# Patient Record
Sex: FEMALE | Race: ASIAN | NOT HISPANIC OR LATINO | URBAN - METROPOLITAN AREA
[De-identification: names, ages, dates, MRNs, and addresses within clinical notes are randomized per-mention and may not be internally consistent; named-entity substitution may affect disease eponyms.]

---

## 2023-04-17 ENCOUNTER — EMERGENCY (EMERGENCY)
Facility: HOSPITAL | Age: 28
LOS: 1 days | Discharge: HOME | End: 2023-04-17
Attending: PERSONAL EMERGENCY RESPONSE ATTENDANT | Admitting: EMERGENCY MEDICINE
Payer: MEDICAID

## 2023-04-17 VITALS
RESPIRATION RATE: 18 BRPM | HEART RATE: 90 BPM | OXYGEN SATURATION: 100 % | SYSTOLIC BLOOD PRESSURE: 128 MMHG | DIASTOLIC BLOOD PRESSURE: 77 MMHG | TEMPERATURE: 99 F

## 2023-04-17 LAB
ALBUMIN SERPL ELPH-MCNC: 3.8 G/DL — SIGNIFICANT CHANGE UP (ref 3.3–5)
ALP SERPL-CCNC: 58 U/L — SIGNIFICANT CHANGE UP (ref 40–120)
ALT FLD-CCNC: 23 U/L — SIGNIFICANT CHANGE UP (ref 4–33)
ANION GAP SERPL CALC-SCNC: 12 MMOL/L — SIGNIFICANT CHANGE UP (ref 7–14)
APPEARANCE UR: CLEAR — SIGNIFICANT CHANGE UP
AST SERPL-CCNC: 36 U/L — HIGH (ref 4–32)
BASOPHILS # BLD AUTO: 0.05 K/UL — SIGNIFICANT CHANGE UP (ref 0–0.2)
BASOPHILS NFR BLD AUTO: 0.4 % — SIGNIFICANT CHANGE UP (ref 0–2)
BILIRUB SERPL-MCNC: <0.2 MG/DL — SIGNIFICANT CHANGE UP (ref 0.2–1.2)
BILIRUB UR-MCNC: NEGATIVE — SIGNIFICANT CHANGE UP
BUN SERPL-MCNC: 8 MG/DL — SIGNIFICANT CHANGE UP (ref 7–23)
CALCIUM SERPL-MCNC: 9.2 MG/DL — SIGNIFICANT CHANGE UP (ref 8.4–10.5)
CHLORIDE SERPL-SCNC: 103 MMOL/L — SIGNIFICANT CHANGE UP (ref 98–107)
CO2 SERPL-SCNC: 18 MMOL/L — LOW (ref 22–31)
COLOR SPEC: COLORLESS — SIGNIFICANT CHANGE UP
CREAT SERPL-MCNC: 0.46 MG/DL — LOW (ref 0.5–1.3)
DIFF PNL FLD: NEGATIVE — SIGNIFICANT CHANGE UP
EGFR: 134 ML/MIN/1.73M2 — SIGNIFICANT CHANGE UP
EOSINOPHIL # BLD AUTO: 0.04 K/UL — SIGNIFICANT CHANGE UP (ref 0–0.5)
EOSINOPHIL NFR BLD AUTO: 0.3 % — SIGNIFICANT CHANGE UP (ref 0–6)
FLUAV AG NPH QL: SIGNIFICANT CHANGE UP
FLUBV AG NPH QL: SIGNIFICANT CHANGE UP
GLUCOSE SERPL-MCNC: 79 MG/DL — SIGNIFICANT CHANGE UP (ref 70–99)
GLUCOSE UR QL: NEGATIVE — SIGNIFICANT CHANGE UP
HCT VFR BLD CALC: 33.9 % — LOW (ref 34.5–45)
HGB BLD-MCNC: 11 G/DL — LOW (ref 11.5–15.5)
IANC: 10.21 K/UL — HIGH (ref 1.8–7.4)
IMM GRANULOCYTES NFR BLD AUTO: 1.1 % — HIGH (ref 0–0.9)
KETONES UR-MCNC: NEGATIVE — SIGNIFICANT CHANGE UP
LEUKOCYTE ESTERASE UR-ACNC: NEGATIVE — SIGNIFICANT CHANGE UP
LYMPHOCYTES # BLD AUTO: 16 % — SIGNIFICANT CHANGE UP (ref 13–44)
LYMPHOCYTES # BLD AUTO: 2.11 K/UL — SIGNIFICANT CHANGE UP (ref 1–3.3)
MCHC RBC-ENTMCNC: 31 PG — SIGNIFICANT CHANGE UP (ref 27–34)
MCHC RBC-ENTMCNC: 32.4 GM/DL — SIGNIFICANT CHANGE UP (ref 32–36)
MCV RBC AUTO: 95.5 FL — SIGNIFICANT CHANGE UP (ref 80–100)
MONOCYTES # BLD AUTO: 0.66 K/UL — SIGNIFICANT CHANGE UP (ref 0–0.9)
MONOCYTES NFR BLD AUTO: 5 % — SIGNIFICANT CHANGE UP (ref 2–14)
NEUTROPHILS # BLD AUTO: 10.21 K/UL — HIGH (ref 1.8–7.4)
NEUTROPHILS NFR BLD AUTO: 77.2 % — HIGH (ref 43–77)
NITRITE UR-MCNC: NEGATIVE — SIGNIFICANT CHANGE UP
NRBC # BLD: 0 /100 WBCS — SIGNIFICANT CHANGE UP (ref 0–0)
NRBC # FLD: 0 K/UL — SIGNIFICANT CHANGE UP (ref 0–0)
PH UR: 7 — SIGNIFICANT CHANGE UP (ref 5–8)
PLATELET # BLD AUTO: 239 K/UL — SIGNIFICANT CHANGE UP (ref 150–400)
POTASSIUM SERPL-MCNC: 4.5 MMOL/L — SIGNIFICANT CHANGE UP (ref 3.5–5.3)
POTASSIUM SERPL-SCNC: 4.5 MMOL/L — SIGNIFICANT CHANGE UP (ref 3.5–5.3)
PROT SERPL-MCNC: 7.3 G/DL — SIGNIFICANT CHANGE UP (ref 6–8.3)
PROT UR-MCNC: NEGATIVE — SIGNIFICANT CHANGE UP
RBC # BLD: 3.55 M/UL — LOW (ref 3.8–5.2)
RBC # FLD: 13 % — SIGNIFICANT CHANGE UP (ref 10.3–14.5)
RSV RNA NPH QL NAA+NON-PROBE: SIGNIFICANT CHANGE UP
SARS-COV-2 RNA SPEC QL NAA+PROBE: SIGNIFICANT CHANGE UP
SODIUM SERPL-SCNC: 133 MMOL/L — LOW (ref 135–145)
SP GR SPEC: 1 — LOW (ref 1.01–1.05)
UROBILINOGEN FLD QL: SIGNIFICANT CHANGE UP
WBC # BLD: 13.22 K/UL — HIGH (ref 3.8–10.5)
WBC # FLD AUTO: 13.22 K/UL — HIGH (ref 3.8–10.5)

## 2023-04-17 PROCEDURE — 93010 ELECTROCARDIOGRAM REPORT: CPT

## 2023-04-17 PROCEDURE — 93306 TTE W/DOPPLER COMPLETE: CPT | Mod: 26

## 2023-04-17 PROCEDURE — 99223 1ST HOSP IP/OBS HIGH 75: CPT

## 2023-04-17 RX ORDER — SODIUM CHLORIDE 9 MG/ML
1000 INJECTION INTRAMUSCULAR; INTRAVENOUS; SUBCUTANEOUS ONCE
Refills: 0 | Status: COMPLETED | OUTPATIENT
Start: 2023-04-17 | End: 2023-04-17

## 2023-04-17 RX ADMIN — SODIUM CHLORIDE 1000 MILLILITER(S): 9 INJECTION INTRAMUSCULAR; INTRAVENOUS; SUBCUTANEOUS at 20:03

## 2023-04-17 NOTE — ED ADULT NURSE REASSESSMENT NOTE - NS ED NURSE REASSESS COMMENT FT1
RAC IV not patent. IV removed. 22g IVL placed in left hand. Fluids administered as ordered, see MAR. Respirations even and unlabored, chest rise equal b/l. Pt stable upon leaving bedside. Safety maintained throughout.

## 2023-04-17 NOTE — ED ADULT TRIAGE NOTE - CHIEF COMPLAINT QUOTE
Pt reporting tot he ED for syncope ~ 1 hour ago. + LOC while siting but did not hit her head. Pt is 5 months pregnant, Due date , , 1 miscarriage. No abdominal pain, vaginal bleeding.

## 2023-04-17 NOTE — ED CDU PROVIDER INITIAL DAY NOTE - OBJECTIVE STATEMENT
27y Female, g2po,  at 20 weeks gestation w/pmhx asthma presenting with syncope. Pt states today while wiping down a countertop she developed shortness of breath and chest tightness, took off her bra which did not help, she then developed lightheadedness and blurry vision. Pt sat down as she felt like she was going to pass out, LOC for a "few minutes", was witnessed by , did not fall off chair, no head injury. Pt reports over the past few weeks (2-3) she has been having shortness of breath, feels she can't get enough air in, worse with laying flat. Pt denies fever/chills, cough, wheeze, headache, chest pain, palpitations, abd pain, pelvic pain, vaginal bleeding, vaginal discharge, recent travel or illness or any other concerns. Of note pt went to see her OBGYN after the incident and was sent to the ED. IN ER ekg wnl, abs wnl, TTE performed, however unabl eto get results yet, will be sent to cdu for tele, await TTE results, reasses

## 2023-04-17 NOTE — ED PROVIDER NOTE - NS ED ATTENDING STATEMENT MOD
This was a shared visit with the ZAHIRA. I reviewed and verified the documentation and independently performed the documented:

## 2023-04-17 NOTE — ED ADULT NURSE NOTE - OBJECTIVE STATEMENT
Received pt in bed A and Ox3 in NAD [facility] extremitates are strong and equal B/L  pt c/o having an episode of SOB , diaphoresis and syncope, denies fall or injury reports was eased tot he floor by a bystander,   no obvious injuries noted, pt denies any OB complaints.

## 2023-04-17 NOTE — ED PROVIDER NOTE - OBJECTIVE STATEMENT
27yF at 20 weeks gestation w/pmhx asthma presenting with syncope. Pt states today while wiping down a countertop she developed shortness of breath and chest tightness, took off her bra which did not help, she then developed lightheadedness and blurry vision. Pt sat down as she felt like she was going to pass out, LOC for a "few minutes", was witnessed by , did not fall off chair, no head injury. Pt reports over the past few weeks (2-3) she has been having shortness of breath, feels she can't get enough air in, worse with laying flat. Pt denies fever/chills, cough, wheeze, headache, chest pain, palpitations, abd pain, pelvic pain, vaginal bleeding, vaginal discharge, recent travel or illness or any other concerns. Of note pt went to see her OBGYN after the incident and was sent to the ED.

## 2023-04-17 NOTE — ED PROVIDER NOTE - PROGRESS NOTE DETAILS
URVASHI Kuhn: Spoke with pts MARIXA, she was seen in the office 3 days ago (not today) for shortness of breath, was told at that time to come to the ED for evaluation. Discussed plan to check basic labs, echo, if no evidence of right heart strain on echo unlikely PE, would d/c home with OB f/u in the office. Pts OB  [phone] in agreement with this plan. URVASHI Kuhn: Echo performed today but was not read, attempted to reach cardiology covering for echo reads without success. Will place in CDU on tele awaiting echo results.

## 2023-04-17 NOTE — ED PROVIDER NOTE - ATTENDING APP SHARED VISIT CONTRIBUTION OF CARE
Attending MD Valencia:  I performed a history and physical exam of the patient and discussed their management with the PA. I reviewed the PA's note and agree with the documented findings and plan of care. My medical decision making and observations are found above.

## 2023-04-17 NOTE — ED CDU PROVIDER INITIAL DAY NOTE - CLINICAL SUMMARY MEDICAL DECISION MAKING FREE TEXT BOX
27y Female, g2po,  at 20 weeks gestation w/pmhx asthma presenting with syncope. Pt states today while wiping down a countertop she developed shortness of breath and chest tightness, took off her bra which did not help, she then developed lightheadedness and blurry vision. Pt sat down as she felt like she was going to pass out, LOC for a "few minutes", was witnessed by , did not fall off chair, no head injury. Pt reports over the past few weeks (2-3) she has been having shortness of breath, feels she can't get enough air in, worse with laying flat. Pt denies fever/chills, cough, wheeze, headache, chest pain, palpitations, abd pain, pelvic pain, vaginal bleeding, vaginal discharge, recent travel or illness or any other concerns. Of note pt went to see her OBGYN after the incident and was sent to the ED. IN ER ekg wnl, abs wnl, TTE performed, however unabl eto get results yet, will be sent to cdu for tele, await TTE results, reasses 27y Female, g2po,  at 20 weeks gestation w/pmhx asthma presenting with syncope. Pt states today while wiping down a countertop she developed shortness of breath and chest tightness, took off her bra which did not help, she then developed lightheadedness and blurry vision. Pt sat down as she felt like she was going to pass out, LOC for a "few minutes", was witnessed by , did not fall off chair, no head injury. Pt reports over the past few weeks (2-3) she has been having shortness of breath, feels she can't get enough air in, worse with laying flat. Pt denies fever/chills, cough, wheeze, headache, chest pain, palpitations, abd pain, pelvic pain, vaginal bleeding, vaginal discharge, recent travel or illness or any other concerns. Of note pt went to see her OBGYN after the incident and was sent to the ED. IN ER ekg wnl, abs wnl, TTE performed, however unabl eto get results yet, will be sent to cdu for tele, await TTE results, estrada Uriostegui MD: Patient is a 26 yo F at 20 weeks grestation here for syncopal episode. Patient was seen and examined by Dr. Valencia and signed out to me pending echo and disposition. Echo completed but read is pending. For this reason, patient placed in CDU for tele monitoring and pending TTE results. Patient has remained stable in the emergency department.

## 2023-04-17 NOTE — ED PROVIDER NOTE - CLINICAL SUMMARY MEDICAL DECISION MAKING FREE TEXT BOX
Attending MD Valencia.  Agree with above.  Pt is a 28 yo fem at ~20 wks gestation, pmhx of asthma.  For past 2-3 wks has had SOB with difficulty getting enough air.  Today at work felt light-headed, SOB, vision changed, hearing changed, sat in a chair.  Witnessed by  to lose consciousness for ~3 min per .  Pt saw OB in office and was sent to ED.  No pelvic pain, abdominal pain, vaginal bleeding/d/c.  's.  Pt feels persistent fetal movement.  Pt endorses chest tightness rather than chest pain.  Chest tightness x 2-3 wks prior to this.  Pt hemodynamically stable and well appearing. Planned discussion with OB and prob CDU stay for echo. Attending MD Valencia.  Agree with above.  Pt is a 28 yo fem at ~20 wks gestation, pmhx of asthma.  For past 2-3 wks has had SOB with difficulty getting enough air.  Today at work felt light-headed, SOB, vision changed, hearing changed, sat in a chair.  Witnessed by  to lose consciousness for ~3 min per .  Pt saw OB in office and was sent to ED.  No pelvic pain, abdominal pain, vaginal bleeding/d/c.  's.  Pt feels persistent fetal movement.  Pt endorses chest tightness rather than chest pain.  Chest tightness x 2-3 wks prior to this.  Pt hemodynamically stable and well appearing. Planned discussion with OB and prob CDU stay for echo.  Planned labs, OB recs and likely echo +/- CDU stay. At this time pt is hemodynamically stable, not hypoxic, no pleuritic CP/resp distress, no LE edema.  Plan discussed with pt's OB who is in agreement.  Pt stable at time of signout to incoming team.

## 2023-04-18 VITALS
DIASTOLIC BLOOD PRESSURE: 61 MMHG | TEMPERATURE: 98 F | SYSTOLIC BLOOD PRESSURE: 100 MMHG | HEART RATE: 99 BPM | OXYGEN SATURATION: 100 % | RESPIRATION RATE: 19 BRPM

## 2023-04-18 LAB
CULTURE RESULTS: SIGNIFICANT CHANGE UP
SPECIMEN SOURCE: SIGNIFICANT CHANGE UP

## 2023-04-18 PROCEDURE — 99238 HOSP IP/OBS DSCHRG MGMT 30/<: CPT

## 2023-04-18 NOTE — ED CDU PROVIDER SUBSEQUENT DAY NOTE - PROGRESS NOTE DETAILS
cdu jamel mcintyre: pt rested comfortably in bed all night, on tele, awaiting TTE results, will reasses Pt reassessed, no complaints this morning. Feeling well. ECHO wnl. Will dc w/ close GYN/OB/tele doc follow up

## 2023-04-18 NOTE — ED CDU PROVIDER DISPOSITION NOTE - CLINICAL COURSE
27y Female, g2po,  at 20 weeks gestation w/pmhx asthma presenting with syncope. Pt states today while wiping down a countertop she developed shortness of breath and chest tightness, took off her bra which did not help, she then developed lightheadedness and blurry vision. Pt sat down as she felt like she was going to pass out, LOC for a "few minutes", was witnessed by , did not fall off chair, no head injury. Pt reports over the past few weeks (2-3) she has been having shortness of breath, feels she can't get enough air in, worse with laying flat. Pt denies fever/chills, cough, wheeze, headache, chest pain, palpitations, abd pain, pelvic pain, vaginal bleeding, vaginal discharge, recent travel or illness or any other concerns. Of note pt went to see her OBGYN after the incident and was sent to the ED. IN ER ekg wnl, abs wnl, TTE performed, however unabl eto get results yet, will be sent to cdu for tele, await TTE results, reassess. Pt reassessed, no complaints this morning. Feeling well. ECHO wnl. Will dc w/ close GYN/OB/tele doc follow up.

## 2023-04-18 NOTE — ED CDU PROVIDER DISPOSITION NOTE - PATIENT PORTAL LINK FT
You can access the FollowMyHealth Patient Portal offered by [facility] by registering at the following website: [email or web address]. By joining [employer]’s FollowMyHealth portal, you will also be able to view your health information using other applications (apps) compatible with our system.

## 2023-04-18 NOTE — ED CDU PROVIDER DISPOSITION NOTE - NSFOLLOWUPINSTRUCTIONS_ED_ALL_ED_FT
See your primary care doctor within 24-48 hours. See your OBGYN this week for further evaluation, bring copies of all reports with you. Your obgyn may recommend you follow up with a cardiologist- our discharge center will call you to help schedule an appointment. Return to the ER for worsening symptoms or any other concerns.

## 2023-04-18 NOTE — ED CDU PROVIDER DISPOSITION NOTE - DISPOSITION TYPE
----- Message from Mango Blakely MD sent at 9/25/2019  9:19 AM EDT -----  Hemoglobin 10 3, recommend iron 1-2 times daily as tolerated for anemia  Please review with patient    Thanks DISCHARGE

## 2023-04-18 NOTE — ED CDU PROVIDER SUBSEQUENT DAY NOTE - ATTENDING APP SHARED VISIT CONTRIBUTION OF CARE
I performed a face to face evaluation of this patient and obtained a history and performed a full exam.  I agree with the history, physical exam and plan of the PA.  27y Female, g2po,  at 20 weeks gestation w/pmhx asthma presenting with syncope. Pt states today while wiping down a countertop she developed shortness of breath and chest tightness, took off her bra which did not help, she then developed lightheadedness and blurry vision. Pt sat down as she felt like she was going to pass out, LOC for a "few minutes", was witnessed by , did not fall off chair, no head injury. Pt reports over the past few weeks (2-3) she has been having shortness of breath, feels she can't get enough air in, worse with laying flat. Pt denies fever/chills, cough, wheeze, headache, chest pain, palpitations, abd pain, pelvic pain, vaginal bleeding, vaginal discharge, recent travel or illness or any other concerns. Of note pt went to see her OBGYN after the incident and was sent to the ED. IN ER ekg wnl, abs wnl, TTE performed, however unabl eto get results yet, will be sent to cdu for tele, await TTE results, reasses    I agree with the above plan- saw her at 9am also at disposition time as well.  Pt in nad subsequent day and at disposition at 9am.  No complaints and normal heart, lung and neuro exam, belly nontender.  Awaiting results prior to dispo home.

## 2023-04-18 NOTE — ED CDU PROVIDER DISPOSITION NOTE - ATTENDING CONTRIBUTION TO CARE
I performed a face to face evaluation of this patient and obtained a history and performed a full exam.  I agree with the history, physical exam and plan of the PA.  27y Female, g2po,  at 20 weeks gestation w/pmhx asthma presenting with syncope. Pt states today while wiping down a countertop she developed shortness of breath and chest tightness, took off her bra which did not help, she then developed lightheadedness and blurry vision. Pt sat down as she felt like she was going to pass out, LOC for a "few minutes", was witnessed by , did not fall off chair, no head injury. Pt reports over the past few weeks (2-3) she has been having shortness of breath, feels she can't get enough air in, worse with laying flat. Pt denies fever/chills, cough, wheeze, headache, chest pain, palpitations, abd pain, pelvic pain, vaginal bleeding, vaginal discharge, recent travel or illness or any other concerns. Of note pt went to see her OBGYN after the incident and was sent to the ED. IN ER ekg wnl, abs wnl, TTE performed, however unabl eto get results yet, will be sent to cdu for tele, await TTE results, reassess. Pt reassessed, no complaints this morning. Feeling well. ECHO wnl. Will dc w/ close GYN/OB/tele doc follow up.    Pt this am with normal exam, hearrt s1,s2 rrr, no m,r,g, lungs cta, head wnl, belly gravid and nontender, neuro wnl

## 2023-08-06 ENCOUNTER — OUTPATIENT (OUTPATIENT)
Dept: OUTPATIENT SERVICES | Facility: HOSPITAL | Age: 28
LOS: 1 days | Discharge: HOME | End: 2023-08-06
Payer: MEDICAID

## 2023-08-06 ENCOUNTER — EMERGENCY (EMERGENCY)
Facility: HOSPITAL | Age: 28
LOS: 1 days | Discharge: STILL ADMITTED OR ADMINISTRATIVE | End: 2023-08-06
Admitting: EMERGENCY MEDICINE
Payer: MEDICAID

## 2023-08-06 VITALS
OXYGEN SATURATION: 99 % | TEMPERATURE: 98 F | HEART RATE: 102 BPM | RESPIRATION RATE: 17 BRPM | DIASTOLIC BLOOD PRESSURE: 69 MMHG | SYSTOLIC BLOOD PRESSURE: 113 MMHG

## 2023-08-06 VITALS
RESPIRATION RATE: 16 BRPM | DIASTOLIC BLOOD PRESSURE: 62 MMHG | HEART RATE: 102 BPM | TEMPERATURE: 98 F | SYSTOLIC BLOOD PRESSURE: 101 MMHG

## 2023-08-06 VITALS — DIASTOLIC BLOOD PRESSURE: 51 MMHG | SYSTOLIC BLOOD PRESSURE: 95 MMHG | HEART RATE: 95 BPM

## 2023-08-06 DIAGNOSIS — Z90.49 ACQUIRED ABSENCE OF OTHER SPECIFIED PARTS OF DIGESTIVE TRACT: Chronic | ICD-10-CM

## 2023-08-06 DIAGNOSIS — O26.899 OTHER SPECIFIED PREGNANCY RELATED CONDITIONS, UNSPECIFIED TRIMESTER: ICD-10-CM

## 2023-08-06 DIAGNOSIS — Z98.890 OTHER SPECIFIED POSTPROCEDURAL STATES: Chronic | ICD-10-CM

## 2023-08-06 LAB
APPEARANCE UR: CLEAR — SIGNIFICANT CHANGE UP
BILIRUB UR-MCNC: NEGATIVE — SIGNIFICANT CHANGE UP
COLOR SPEC: YELLOW — SIGNIFICANT CHANGE UP
DIFF PNL FLD: NEGATIVE — SIGNIFICANT CHANGE UP
GLUCOSE UR QL: NEGATIVE MG/DL — SIGNIFICANT CHANGE UP
KETONES UR-MCNC: NEGATIVE MG/DL — SIGNIFICANT CHANGE UP
LEUKOCYTE ESTERASE UR-ACNC: NEGATIVE — SIGNIFICANT CHANGE UP
NITRITE UR-MCNC: NEGATIVE — SIGNIFICANT CHANGE UP
PH UR: 7 — SIGNIFICANT CHANGE UP (ref 5–8)
PROT UR-MCNC: SIGNIFICANT CHANGE UP MG/DL
SP GR SPEC: 1.01 — SIGNIFICANT CHANGE UP (ref 1–1.03)
UROBILINOGEN FLD QL: 1 MG/DL — SIGNIFICANT CHANGE UP (ref 0.2–1)

## 2023-08-06 PROCEDURE — 99212 OFFICE O/P EST SF 10 MIN: CPT

## 2023-08-06 PROCEDURE — L9996: CPT

## 2023-08-06 RX ORDER — SIMETHICONE 80 MG/1
80 TABLET, CHEWABLE ORAL ONCE
Refills: 0 | Status: COMPLETED | OUTPATIENT
Start: 2023-08-06 | End: 2023-08-06

## 2023-08-06 RX ADMIN — SIMETHICONE 80 MILLIGRAM(S): 80 TABLET, CHEWABLE ORAL at 18:37

## 2023-08-06 NOTE — OB PROVIDER TRIAGE NOTE - NS_OBACUITYLEVEL_OBGYN_ALL_OB
[facility] [place] [address] [id number] [phone]               Thank you for choosing us for your health care visit with Gertrude Hickey MD.  We are glad to serve you and happy to provide you with this summary of y Commonly known as:  PACERONE           B-12 500 MCG Tabs   Take 1 capsule by mouth daily.            Cholestyramine 4 GM/DOSE Powd   DISSOLVE 1 SCOOP (4 GRAMS) INTO LIQUID AND TAKE BY MOUTH EVERY DAY           Cranberry 500 MG Caps   Take 1 capsule by mouth office, you can view your past visit information in [employer] by going to Visits < Visit Summaries. [employer] questions? Call [phone] for help. [employer] is NOT to be used for urgent needs. For medical emergencies, dial 911.            Visit [facility]

## 2023-08-06 NOTE — OB PROVIDER TRIAGE NOTE - HISTORY OF PRESENT ILLNESS
This is a 27 year old  at 36.2 weeks gestational age presents with complaints of abdominal pain that began while she was out to lunch at 1pm today. Pt states she was eating rice, shrimp and chicken when started feeling abdominal pain while eating. Pt unable to describe abdominal pain- states " it feels like when you have a stomach ache". Denies sharp, dull, constant, intermittent diffuse or localized pain. Denies nausea/ vomitting, fever chills. Reports 1 episode of " loose" bowel movement at 2:30pm, but denies diarrhea. Pt states pain has mostly resolved at this point while obtaining HPI in D&T.  Pt reports +GFM, denies LOF, VB or contractions.     Patient receives PNC with Dr Conor Barker in [place].     AP course- uncomplicated as per patient

## 2023-08-06 NOTE — OB PROVIDER TRIAGE NOTE - NSOBPROVIDERNOTE_OBGYN_ALL_OB_FT
Discussed with Dr Cruz  u/will u/c sent  Mylicon 80 mg PO given Discussed with Dr Cruz  u/a u/c sent  Mylicon 80 mg PO given      Pt states she feels better and does not need to wait for mylicon to kick in and desires to be discharged     This is a 27 year old  at 36.2 weeks gestational age presented with abdominal pain after eating    No evidence of acute abdomen  maternal/ fetal surveillance reassuring  Plan discussed with Dr Macedo   labor precautions reviewed with patient  instructed to follow up with primary OB provider Dr Barker at [place]  continue fetal kick counts, rest and activity as tolerated, increase PO fluid  pt verbalized understanding of instructions given  discharged homed at at 1915

## 2023-08-06 NOTE — OB RN TRIAGE NOTE - NS_FETALMOVEMENT_OBGYN_ALL_OB
----- Message from Ela Justice sent at 2/1/2018  2:29 PM CST -----  Contact: [phone]   Mago  at home   Caller says she wants you to explain the test results to her, wants more info on the labs.    Has some questions.    Pls call.   
Patient contacted via phone. Discussed test results again in detail. She verbalized understanding of the information. Also reinforced to take thyroid medication on an empty stomach with water and to wait 30-45 minutes before eating or taking other medications.   
Present, unchanged

## 2023-08-06 NOTE — ED ADULT TRIAGE NOTE - CHIEF COMPLAINT QUOTE
Pt 36 weeks pregnant c/o abdominal pain and cramping since eating lunch this afternoon, denies vaginal bleeding/ discharge

## 2023-08-06 NOTE — OB PROVIDER TRIAGE NOTE - ADDITIONAL INSTRUCTIONS
1850  Pt states she feels better and does not need to wait for mylicon to kick in and desires to be discharged     This is a 27 year old  at 36.2 weeks gestational age presented with abdominal pain after eating    No evidence of acute abdomen  maternal/ fetal surveillance reassuring  Plan discussed with Dr Macedo   labor precautions reviewed with patient  instructed to follow up with primary OB provider Dr Barker at [place]  continue fetal kick counts, rest and activity as tolerated, increase PO fluid  pt verbalized understanding of instructions given  discharged homed at at 1915

## 2023-08-06 NOTE — OB PROVIDER TRIAGE NOTE - NSHPLABSRESULTS_GEN_ALL_CORE
Urinalysis Basic - ( 06 Aug 2023 18:22 )    Color: Yellow / Appearance: Clear / S.015 / pH: x  Gluc: x / Ketone: Negative mg/dL  / Bili: Negative / Urobili: 1.0 mg/dL   Blood: x / Protein: Trace mg/dL / Nitrite: Negative   Leuk Esterase: Negative / RBC: x / WBC x   Sq Epi: x / Non Sq Epi: x / Bacteria: x

## 2023-08-07 PROBLEM — Z78.9 OTHER SPECIFIED HEALTH STATUS: Chronic | Status: ACTIVE | Noted: 2023-04-17

## 2023-08-07 LAB
CULTURE RESULTS: SIGNIFICANT CHANGE UP
SPECIMEN SOURCE: SIGNIFICANT CHANGE UP

## 2023-08-08 DIAGNOSIS — O26.893 OTHER SPECIFIED PREGNANCY RELATED CONDITIONS, THIRD TRIMESTER: ICD-10-CM

## 2023-08-08 DIAGNOSIS — O09.293 SUPERVISION OF PREGNANCY WITH OTHER POOR REPRODUCTIVE OR OBSTETRIC HISTORY, THIRD TRIMESTER: ICD-10-CM

## 2023-08-08 DIAGNOSIS — Z3A.36 36 WEEKS GESTATION OF PREGNANCY: ICD-10-CM

## 2023-08-08 DIAGNOSIS — R10.9 UNSPECIFIED ABDOMINAL PAIN: ICD-10-CM

## 2023-08-17 PROBLEM — Z00.00 ENCOUNTER FOR PREVENTIVE HEALTH EXAMINATION: Status: ACTIVE | Noted: 2023-08-17

## 2023-08-22 ENCOUNTER — APPOINTMENT (OUTPATIENT)
Dept: ANTEPARTUM | Facility: CLINIC | Age: 28
End: 2023-08-22
Payer: MEDICAID

## 2023-08-22 ENCOUNTER — ASOB RESULT (OUTPATIENT)
Age: 28
End: 2023-08-22

## 2023-08-22 VITALS
DIASTOLIC BLOOD PRESSURE: 73 MMHG | BODY MASS INDEX: 32.1 KG/M2 | HEIGHT: 61 IN | HEART RATE: 105 BPM | SYSTOLIC BLOOD PRESSURE: 113 MMHG | WEIGHT: 170 LBS

## 2023-08-22 PROCEDURE — 76819 FETAL BIOPHYS PROFIL W/O NST: CPT | Mod: 59

## 2023-08-22 PROCEDURE — 99202 OFFICE O/P NEW SF 15 MIN: CPT | Mod: 25

## 2023-08-22 PROCEDURE — 76816 OB US FOLLOW-UP PER FETUS: CPT

## 2023-08-24 ENCOUNTER — OUTPATIENT (OUTPATIENT)
Dept: OUTPATIENT SERVICES | Facility: HOSPITAL | Age: 28
LOS: 1 days | End: 2023-08-24
Payer: MEDICAID

## 2023-08-24 ENCOUNTER — TRANSCRIPTION ENCOUNTER (OUTPATIENT)
Age: 28
End: 2023-08-24

## 2023-08-24 DIAGNOSIS — Z01.818 ENCOUNTER FOR OTHER PREPROCEDURAL EXAMINATION: ICD-10-CM

## 2023-08-24 DIAGNOSIS — Z98.890 OTHER SPECIFIED POSTPROCEDURAL STATES: Chronic | ICD-10-CM

## 2023-08-24 DIAGNOSIS — Z90.49 ACQUIRED ABSENCE OF OTHER SPECIFIED PARTS OF DIGESTIVE TRACT: Chronic | ICD-10-CM

## 2023-08-24 LAB
ANION GAP SERPL CALC-SCNC: 9 MMOL/L — SIGNIFICANT CHANGE UP (ref 5–17)
APTT BLD: 28.3 SEC — SIGNIFICANT CHANGE UP (ref 24.5–35.6)
BLD GP AB SCN SERPL QL: SIGNIFICANT CHANGE UP
BUN SERPL-MCNC: 7 MG/DL — SIGNIFICANT CHANGE UP (ref 7–18)
CALCIUM SERPL-MCNC: 8.8 MG/DL — SIGNIFICANT CHANGE UP (ref 8.4–10.5)
CHLORIDE SERPL-SCNC: 107 MMOL/L — SIGNIFICANT CHANGE UP (ref 96–108)
CO2 SERPL-SCNC: 21 MMOL/L — LOW (ref 22–31)
CREAT SERPL-MCNC: 0.6 MG/DL — SIGNIFICANT CHANGE UP (ref 0.5–1.3)
EGFR: 126 ML/MIN/1.73M2 — SIGNIFICANT CHANGE UP
GLUCOSE SERPL-MCNC: 103 MG/DL — HIGH (ref 70–99)
HCT VFR BLD CALC: 30.5 % — LOW (ref 34.5–45)
HGB BLD-MCNC: 9 G/DL — LOW (ref 11.5–15.5)
INR BLD: 0.89 RATIO — SIGNIFICANT CHANGE UP (ref 0.85–1.18)
MCHC RBC-ENTMCNC: 24.3 PG — LOW (ref 27–34)
MCHC RBC-ENTMCNC: 29.5 GM/DL — LOW (ref 32–36)
MCV RBC AUTO: 82.4 FL — SIGNIFICANT CHANGE UP (ref 80–100)
NRBC # BLD: 0 /100 WBCS — SIGNIFICANT CHANGE UP (ref 0–0)
PLATELET # BLD AUTO: 154 K/UL — SIGNIFICANT CHANGE UP (ref 150–400)
POTASSIUM SERPL-MCNC: 4.1 MMOL/L — SIGNIFICANT CHANGE UP (ref 3.5–5.3)
POTASSIUM SERPL-SCNC: 4.1 MMOL/L — SIGNIFICANT CHANGE UP (ref 3.5–5.3)
PROTHROM AB SERPL-ACNC: 10.2 SEC — SIGNIFICANT CHANGE UP (ref 9.5–13)
RBC # BLD: 3.7 M/UL — LOW (ref 3.8–5.2)
RBC # FLD: 18.5 % — HIGH (ref 10.3–14.5)
SODIUM SERPL-SCNC: 137 MMOL/L — SIGNIFICANT CHANGE UP (ref 135–145)
WBC # BLD: 7.05 K/UL — SIGNIFICANT CHANGE UP (ref 3.8–10.5)
WBC # FLD AUTO: 7.05 K/UL — SIGNIFICANT CHANGE UP (ref 3.8–10.5)

## 2023-08-25 ENCOUNTER — INPATIENT (INPATIENT)
Facility: HOSPITAL | Age: 28
LOS: 1 days | Discharge: HOME | End: 2023-08-27
Attending: OBSTETRICS & GYNECOLOGY | Admitting: OBSTETRICS & GYNECOLOGY
Payer: MEDICAID

## 2023-08-25 VITALS
RESPIRATION RATE: 18 BRPM | HEART RATE: 97 BPM | DIASTOLIC BLOOD PRESSURE: 65 MMHG | SYSTOLIC BLOOD PRESSURE: 108 MMHG | OXYGEN SATURATION: 99 % | TEMPERATURE: 99 F

## 2023-08-25 DIAGNOSIS — O26.899 OTHER SPECIFIED PREGNANCY RELATED CONDITIONS, UNSPECIFIED TRIMESTER: ICD-10-CM

## 2023-08-25 DIAGNOSIS — Z3A.00 WEEKS OF GESTATION OF PREGNANCY NOT SPECIFIED: ICD-10-CM

## 2023-08-25 DIAGNOSIS — Z90.49 ACQUIRED ABSENCE OF OTHER SPECIFIED PARTS OF DIGESTIVE TRACT: Chronic | ICD-10-CM

## 2023-08-25 DIAGNOSIS — Z34.80 ENCOUNTER FOR SUPERVISION OF OTHER NORMAL PREGNANCY, UNSPECIFIED TRIMESTER: ICD-10-CM

## 2023-08-25 DIAGNOSIS — Z98.890 OTHER SPECIFIED POSTPROCEDURAL STATES: Chronic | ICD-10-CM

## 2023-08-25 LAB
ABO RH CONFIRMATION: SIGNIFICANT CHANGE UP
T PALLIDUM AB TITR SER: NEGATIVE — SIGNIFICANT CHANGE UP

## 2023-08-25 RX ORDER — MAGNESIUM HYDROXIDE 400 MG/1
30 TABLET, CHEWABLE ORAL
Refills: 0 | Status: DISCONTINUED | OUTPATIENT
Start: 2023-08-25 | End: 2023-08-27

## 2023-08-25 RX ORDER — IBUPROFEN 200 MG
600 TABLET ORAL EVERY 6 HOURS
Refills: 0 | Status: DISCONTINUED | OUTPATIENT
Start: 2023-08-25 | End: 2023-08-27

## 2023-08-25 RX ORDER — DIPHENHYDRAMINE HCL 50 MG
25 CAPSULE ORAL EVERY 6 HOURS
Refills: 0 | Status: DISCONTINUED | OUTPATIENT
Start: 2023-08-25 | End: 2023-08-27

## 2023-08-25 RX ORDER — SIMETHICONE 80 MG/1
80 TABLET, CHEWABLE ORAL EVERY 4 HOURS
Refills: 0 | Status: DISCONTINUED | OUTPATIENT
Start: 2023-08-25 | End: 2023-08-27

## 2023-08-25 RX ORDER — CITRIC ACID/SODIUM CITRATE 300-500 MG
30 SOLUTION, ORAL ORAL ONCE
Refills: 0 | Status: COMPLETED | OUTPATIENT
Start: 2023-08-25 | End: 2023-08-25

## 2023-08-25 RX ORDER — OXYTOCIN 10 UNIT/ML
333.33 VIAL (ML) INJECTION
Qty: 20 | Refills: 0 | Status: DISCONTINUED | OUTPATIENT
Start: 2023-08-25 | End: 2023-08-27

## 2023-08-25 RX ORDER — MORPHINE SULFATE 50 MG/1
0.2 CAPSULE, EXTENDED RELEASE ORAL ONCE
Refills: 0 | Status: DISCONTINUED | OUTPATIENT
Start: 2023-08-25 | End: 2023-08-27

## 2023-08-25 RX ORDER — AZITHROMYCIN 500 MG/1
500 TABLET, FILM COATED ORAL ONCE
Refills: 0 | Status: COMPLETED | OUTPATIENT
Start: 2023-08-25 | End: 2023-08-25

## 2023-08-25 RX ORDER — SODIUM CHLORIDE 9 MG/ML
1000 INJECTION, SOLUTION INTRAVENOUS ONCE
Refills: 0 | Status: COMPLETED | OUTPATIENT
Start: 2023-08-25 | End: 2023-08-25

## 2023-08-25 RX ORDER — ACETAMINOPHEN 500 MG
975 TABLET ORAL EVERY 6 HOURS
Refills: 0 | Status: DISCONTINUED | OUTPATIENT
Start: 2023-08-25 | End: 2023-08-27

## 2023-08-25 RX ORDER — FOLIC ACID 0.8 MG
1 TABLET ORAL DAILY
Refills: 0 | Status: DISCONTINUED | OUTPATIENT
Start: 2023-08-26 | End: 2023-08-27

## 2023-08-25 RX ORDER — CEFAZOLIN SODIUM 1 G
2000 VIAL (EA) INJECTION ONCE
Refills: 0 | Status: COMPLETED | OUTPATIENT
Start: 2023-08-25 | End: 2023-08-25

## 2023-08-25 RX ORDER — FERROUS SULFATE 325(65) MG
325 TABLET ORAL DAILY
Refills: 0 | Status: DISCONTINUED | OUTPATIENT
Start: 2023-08-26 | End: 2023-08-26

## 2023-08-25 RX ORDER — OXYCODONE HYDROCHLORIDE 5 MG/1
5 TABLET ORAL
Refills: 0 | Status: DISCONTINUED | OUTPATIENT
Start: 2023-08-25 | End: 2023-08-27

## 2023-08-25 RX ORDER — LANOLIN
1 OINTMENT (GRAM) TOPICAL EVERY 6 HOURS
Refills: 0 | Status: DISCONTINUED | OUTPATIENT
Start: 2023-08-25 | End: 2023-08-27

## 2023-08-25 RX ORDER — SODIUM CHLORIDE 9 MG/ML
1000 INJECTION, SOLUTION INTRAVENOUS
Refills: 0 | Status: DISCONTINUED | OUTPATIENT
Start: 2023-08-25 | End: 2023-08-27

## 2023-08-25 RX ORDER — KETOROLAC TROMETHAMINE 30 MG/ML
30 SYRINGE (ML) INJECTION EVERY 6 HOURS
Refills: 0 | Status: DISCONTINUED | OUTPATIENT
Start: 2023-08-25 | End: 2023-08-25

## 2023-08-25 RX ORDER — TETANUS TOXOID, REDUCED DIPHTHERIA TOXOID AND ACELLULAR PERTUSSIS VACCINE, ADSORBED 5; 2.5; 8; 8; 2.5 [IU]/.5ML; [IU]/.5ML; UG/.5ML; UG/.5ML; UG/.5ML
0.5 SUSPENSION INTRAMUSCULAR ONCE
Refills: 0 | Status: DISCONTINUED | OUTPATIENT
Start: 2023-08-25 | End: 2023-08-27

## 2023-08-25 RX ORDER — FAMOTIDINE 10 MG/ML
20 INJECTION INTRAVENOUS ONCE
Refills: 0 | Status: COMPLETED | OUTPATIENT
Start: 2023-08-25 | End: 2023-08-25

## 2023-08-25 RX ORDER — HEPARIN SODIUM 5000 [USP'U]/ML
5000 INJECTION INTRAVENOUS; SUBCUTANEOUS EVERY 12 HOURS
Refills: 0 | Status: DISCONTINUED | OUTPATIENT
Start: 2023-08-25 | End: 2023-08-27

## 2023-08-25 RX ORDER — SODIUM CHLORIDE 9 MG/ML
1000 INJECTION, SOLUTION INTRAVENOUS
Refills: 0 | Status: DISCONTINUED | OUTPATIENT
Start: 2023-08-25 | End: 2023-08-25

## 2023-08-25 RX ADMIN — Medication 30 MILLIGRAM(S): at 12:50

## 2023-08-25 RX ADMIN — Medication 30 MILLIGRAM(S): at 06:52

## 2023-08-25 RX ADMIN — Medication 975 MILLIGRAM(S): at 23:15

## 2023-08-25 RX ADMIN — Medication 30 MILLILITER(S): at 02:57

## 2023-08-25 RX ADMIN — AZITHROMYCIN 255 MILLIGRAM(S): 500 TABLET, FILM COATED ORAL at 03:01

## 2023-08-25 RX ADMIN — Medication 975 MILLIGRAM(S): at 08:36

## 2023-08-25 RX ADMIN — Medication 975 MILLIGRAM(S): at 09:30

## 2023-08-25 RX ADMIN — Medication 975 MILLIGRAM(S): at 22:15

## 2023-08-25 RX ADMIN — SODIUM CHLORIDE 2000 MILLILITER(S): 9 INJECTION, SOLUTION INTRAVENOUS at 02:57

## 2023-08-25 RX ADMIN — Medication 30 MILLIGRAM(S): at 18:00

## 2023-08-25 RX ADMIN — Medication 975 MILLIGRAM(S): at 16:17

## 2023-08-25 RX ADMIN — FAMOTIDINE 20 MILLIGRAM(S): 10 INJECTION INTRAVENOUS at 02:57

## 2023-08-25 RX ADMIN — HEPARIN SODIUM 5000 UNIT(S): 5000 INJECTION INTRAVENOUS; SUBCUTANEOUS at 17:43

## 2023-08-25 RX ADMIN — Medication 30 MILLIGRAM(S): at 12:33

## 2023-08-25 RX ADMIN — SODIUM CHLORIDE 125 MILLILITER(S): 9 INJECTION, SOLUTION INTRAVENOUS at 03:01

## 2023-08-25 RX ADMIN — Medication 1000 MILLIUNIT(S)/MIN: at 03:59

## 2023-08-25 RX ADMIN — SIMETHICONE 80 MILLIGRAM(S): 80 TABLET, CHEWABLE ORAL at 16:18

## 2023-08-25 RX ADMIN — Medication 100 MILLIGRAM(S): at 02:56

## 2023-08-25 RX ADMIN — Medication 975 MILLIGRAM(S): at 17:17

## 2023-08-25 RX ADMIN — Medication 30 MILLIGRAM(S): at 17:43

## 2023-08-26 ENCOUNTER — TRANSCRIPTION ENCOUNTER (OUTPATIENT)
Age: 28
End: 2023-08-26

## 2023-08-26 DIAGNOSIS — Z98.891 HISTORY OF UTERINE SCAR FROM PREVIOUS SURGERY: ICD-10-CM

## 2023-08-26 DIAGNOSIS — D64.9 ANEMIA, UNSPECIFIED: ICD-10-CM

## 2023-08-26 LAB
BASOPHILS # BLD AUTO: 0.03 K/UL — SIGNIFICANT CHANGE UP (ref 0–0.2)
BASOPHILS # BLD AUTO: 0.03 K/UL — SIGNIFICANT CHANGE UP (ref 0–0.2)
BASOPHILS NFR BLD AUTO: 0.2 % — SIGNIFICANT CHANGE UP (ref 0–2)
BASOPHILS NFR BLD AUTO: 0.3 % — SIGNIFICANT CHANGE UP (ref 0–2)
EOSINOPHIL # BLD AUTO: 0.07 K/UL — SIGNIFICANT CHANGE UP (ref 0–0.5)
EOSINOPHIL # BLD AUTO: 0.12 K/UL — SIGNIFICANT CHANGE UP (ref 0–0.5)
EOSINOPHIL NFR BLD AUTO: 0.6 % — SIGNIFICANT CHANGE UP (ref 0–6)
EOSINOPHIL NFR BLD AUTO: 0.9 % — SIGNIFICANT CHANGE UP (ref 0–6)
HCT VFR BLD CALC: 23.6 % — LOW (ref 34.5–45)
HCT VFR BLD CALC: 25.2 % — LOW (ref 34.5–45)
HGB BLD-MCNC: 7 G/DL — CRITICAL LOW (ref 11.5–15.5)
HGB BLD-MCNC: 7.5 G/DL — LOW (ref 11.5–15.5)
IMM GRANULOCYTES NFR BLD AUTO: 0.6 % — SIGNIFICANT CHANGE UP (ref 0–0.9)
IMM GRANULOCYTES NFR BLD AUTO: 1.1 % — HIGH (ref 0–0.9)
LYMPHOCYTES # BLD AUTO: 18.6 % — SIGNIFICANT CHANGE UP (ref 13–44)
LYMPHOCYTES # BLD AUTO: 2.55 K/UL — SIGNIFICANT CHANGE UP (ref 1–3.3)
LYMPHOCYTES # BLD AUTO: 2.59 K/UL — SIGNIFICANT CHANGE UP (ref 1–3.3)
LYMPHOCYTES # BLD AUTO: 22.8 % — SIGNIFICANT CHANGE UP (ref 13–44)
MANUAL SMEAR VERIFICATION: SIGNIFICANT CHANGE UP
MCHC RBC-ENTMCNC: 24.6 PG — LOW (ref 27–34)
MCHC RBC-ENTMCNC: 25.3 PG — LOW (ref 27–34)
MCHC RBC-ENTMCNC: 29.7 GM/DL — LOW (ref 32–36)
MCHC RBC-ENTMCNC: 29.8 GM/DL — LOW (ref 32–36)
MCV RBC AUTO: 83.1 FL — SIGNIFICANT CHANGE UP (ref 80–100)
MCV RBC AUTO: 84.8 FL — SIGNIFICANT CHANGE UP (ref 80–100)
MONOCYTES # BLD AUTO: 0.72 K/UL — SIGNIFICANT CHANGE UP (ref 0–0.9)
MONOCYTES # BLD AUTO: 0.84 K/UL — SIGNIFICANT CHANGE UP (ref 0–0.9)
MONOCYTES NFR BLD AUTO: 6.1 % — SIGNIFICANT CHANGE UP (ref 2–14)
MONOCYTES NFR BLD AUTO: 6.3 % — SIGNIFICANT CHANGE UP (ref 2–14)
NEUTROPHILS # BLD AUTO: 10.03 K/UL — HIGH (ref 1.8–7.4)
NEUTROPHILS # BLD AUTO: 7.9 K/UL — HIGH (ref 1.8–7.4)
NEUTROPHILS NFR BLD AUTO: 69.4 % — SIGNIFICANT CHANGE UP (ref 43–77)
NEUTROPHILS NFR BLD AUTO: 73.1 % — SIGNIFICANT CHANGE UP (ref 43–77)
NRBC # BLD: 0 /100 WBCS — SIGNIFICANT CHANGE UP (ref 0–0)
NRBC # BLD: 0 /100 WBCS — SIGNIFICANT CHANGE UP (ref 0–0)
PLAT MORPH BLD: NORMAL — SIGNIFICANT CHANGE UP
PLATELET # BLD AUTO: 111 K/UL — LOW (ref 150–400)
PLATELET # BLD AUTO: 145 K/UL — LOW (ref 150–400)
PLATELET COUNT - ESTIMATE: ABNORMAL
RBC # BLD: 2.84 M/UL — LOW (ref 3.8–5.2)
RBC # BLD: 2.97 M/UL — LOW (ref 3.8–5.2)
RBC # FLD: 18.8 % — HIGH (ref 10.3–14.5)
RBC # FLD: 19.3 % — HIGH (ref 10.3–14.5)
RBC BLD AUTO: NORMAL — SIGNIFICANT CHANGE UP
WBC # BLD: 11.38 K/UL — HIGH (ref 3.8–10.5)
WBC # BLD: 13.72 K/UL — HIGH (ref 3.8–10.5)
WBC # FLD AUTO: 11.38 K/UL — HIGH (ref 3.8–10.5)
WBC # FLD AUTO: 13.72 K/UL — HIGH (ref 3.8–10.5)

## 2023-08-26 RX ORDER — FERROUS SULFATE 325(65) MG
1 TABLET ORAL
Qty: 60 | Refills: 1
Start: 2023-08-26 | End: 2023-10-24

## 2023-08-26 RX ORDER — ASCORBIC ACID 60 MG
500 TABLET,CHEWABLE ORAL THREE TIMES A DAY
Refills: 0 | Status: DISCONTINUED | OUTPATIENT
Start: 2023-08-26 | End: 2023-08-27

## 2023-08-26 RX ORDER — ACETAMINOPHEN 500 MG
3 TABLET ORAL
Qty: 84 | Refills: 0
Start: 2023-08-26 | End: 2023-09-01

## 2023-08-26 RX ORDER — ASCORBIC ACID 60 MG
1 TABLET,CHEWABLE ORAL
Qty: 60 | Refills: 0
Start: 2023-08-26 | End: 2023-09-24

## 2023-08-26 RX ORDER — FERROUS SULFATE 325(65) MG
325 TABLET ORAL THREE TIMES A DAY
Refills: 0 | Status: DISCONTINUED | OUTPATIENT
Start: 2023-08-26 | End: 2023-08-27

## 2023-08-26 RX ORDER — SIMETHICONE 80 MG/1
1 TABLET, CHEWABLE ORAL
Qty: 16 | Refills: 0
Start: 2023-08-26 | End: 2023-08-29

## 2023-08-26 RX ORDER — SENNOSIDES/DOCUSATE SODIUM 8.6MG-50MG
2 TABLET ORAL
Qty: 60 | Refills: 0
Start: 2023-08-26 | End: 2023-09-24

## 2023-08-26 RX ORDER — IBUPROFEN 200 MG
1 TABLET ORAL
Qty: 28 | Refills: 0
Start: 2023-08-26 | End: 2023-09-01

## 2023-08-26 RX ADMIN — Medication 600 MILLIGRAM(S): at 15:01

## 2023-08-26 RX ADMIN — Medication 325 MILLIGRAM(S): at 23:27

## 2023-08-26 RX ADMIN — Medication 600 MILLIGRAM(S): at 07:20

## 2023-08-26 RX ADMIN — SIMETHICONE 80 MILLIGRAM(S): 80 TABLET, CHEWABLE ORAL at 12:49

## 2023-08-26 RX ADMIN — SIMETHICONE 80 MILLIGRAM(S): 80 TABLET, CHEWABLE ORAL at 08:32

## 2023-08-26 RX ADMIN — HEPARIN SODIUM 5000 UNIT(S): 5000 INJECTION INTRAVENOUS; SUBCUTANEOUS at 04:58

## 2023-08-26 RX ADMIN — Medication 600 MILLIGRAM(S): at 22:16

## 2023-08-26 RX ADMIN — Medication 975 MILLIGRAM(S): at 05:42

## 2023-08-26 RX ADMIN — Medication 975 MILLIGRAM(S): at 13:30

## 2023-08-26 RX ADMIN — Medication 975 MILLIGRAM(S): at 19:14

## 2023-08-26 RX ADMIN — OXYCODONE HYDROCHLORIDE 5 MILLIGRAM(S): 5 TABLET ORAL at 11:22

## 2023-08-26 RX ADMIN — Medication 500 MILLIGRAM(S): at 15:50

## 2023-08-26 RX ADMIN — HEPARIN SODIUM 5000 UNIT(S): 5000 INJECTION INTRAVENOUS; SUBCUTANEOUS at 18:24

## 2023-08-26 RX ADMIN — Medication 1 MILLIGRAM(S): at 12:49

## 2023-08-26 RX ADMIN — Medication 600 MILLIGRAM(S): at 15:50

## 2023-08-26 RX ADMIN — Medication 325 MILLIGRAM(S): at 15:01

## 2023-08-26 RX ADMIN — Medication 1 TABLET(S): at 12:49

## 2023-08-26 RX ADMIN — Medication 975 MILLIGRAM(S): at 18:24

## 2023-08-26 RX ADMIN — SIMETHICONE 80 MILLIGRAM(S): 80 TABLET, CHEWABLE ORAL at 18:25

## 2023-08-26 RX ADMIN — Medication 975 MILLIGRAM(S): at 12:49

## 2023-08-26 RX ADMIN — MAGNESIUM HYDROXIDE 30 MILLILITER(S): 400 TABLET, CHEWABLE ORAL at 08:31

## 2023-08-26 RX ADMIN — Medication 600 MILLIGRAM(S): at 00:39

## 2023-08-26 RX ADMIN — Medication 975 MILLIGRAM(S): at 04:42

## 2023-08-26 RX ADMIN — OXYCODONE HYDROCHLORIDE 5 MILLIGRAM(S): 5 TABLET ORAL at 12:00

## 2023-08-26 RX ADMIN — Medication 600 MILLIGRAM(S): at 01:39

## 2023-08-26 RX ADMIN — Medication 500 MILLIGRAM(S): at 23:27

## 2023-08-26 RX ADMIN — Medication 600 MILLIGRAM(S): at 06:30

## 2023-08-26 RX ADMIN — Medication 600 MILLIGRAM(S): at 21:16

## 2023-08-26 NOTE — DISCHARGE NOTE OB - CARE PROVIDER_API CALL
Conor Barker  Obstetrics and Gynecology  [address], [address]  [place] [address]  Phone: [phone]  Fax: [phone]  Follow Up Time: 2 weeks

## 2023-08-26 NOTE — DISCHARGE NOTE OB - NS MD DC FALL RISK RISK
For information on Fall & Injury Prevention, visit: [email or web address] OR  [email or web address] OR  https://www.cdc.gov/steadi/patient.html

## 2023-08-26 NOTE — DISCHARGE NOTE OB - BREASTFEEDING PROVIDES STABLE TEMPERATURE THROUGH SKIN TO SKIN CONTACT
SB 3 PAL Welcome Letter Sent    Anastasiia CABEZAS RN   Patient Advocate Nisa SQUIRES RN  [facility]  [phone]     Statement Selected

## 2023-08-26 NOTE — DISCHARGE NOTE OB - PATIENT PORTAL LINK FT
You can access the FollowMyHealth Patient Portal offered by [facility] by registering at the following website: [email or web address]. By joining [employer]’s FollowMyHealth portal, you will also be able to view your health information using other applications (apps) compatible with our system.

## 2023-08-26 NOTE — DISCHARGE NOTE OB - MEDICATION SUMMARY - MEDICATIONS TO TAKE
I will START or STAY ON the medications listed below when I get home from the hospital:    ibuprofen 600 mg oral tablet  -- 1 tab(s) by mouth every 6 hours as needed for  moderate pain  -- Indication: For moderate pain    acetaminophen 325 mg oral capsule  -- 3 cap(s) by mouth every 6 hours as needed for  mild pain  -- Indication: For mild pain    Prenatal Multivitamins with Folic Acid 0.4 mg oral capsule  -- 1 tab(s) by mouth once a day  -- Indication: For breast feeding    ferrous sulfate 325 mg (65 mg elemental iron) oral tablet  -- 1 tab(s) by mouth 2 times a day  -- Indication: For Chronic anemia    Senna Plus 50 mg-8.6 mg oral tablet  -- 2 tab(s) by mouth once a day (at bedtime) as needed for  constipation  -- Indication: For Constipation    simethicone 80 mg oral tablet, chewable  -- 1 tab(s) chewed every 6 hours as needed for  indigestion as needed for gas pain  -- Indication: For gas pain    ascorbic acid 500 mg oral capsule  -- 1 cap(s) by mouth 2 times a day  -- Indication: For Chronic anemia

## 2023-08-26 NOTE — DISCHARGE NOTE OB - CARE PLAN
Principal Discharge DX:	Status post primary low transverse  section  Assessment and plan of treatment:	Continue breastfeeding.  Motrin as needed for pain.  Ambulate daily.  No heavy lifting or anything per vagina x 6 weeks - no sex, tampons, douching, tub baths, etc.  Follow up in office in 1-2 weeks for incision check, and then at 6 weeks for postpartum check.  Secondary Diagnosis:	Chronic anemia  Assessment and plan of treatment:	take iron, folic acid, vitamin C, and prenatal vitamins. eat iron fortified food. Please take iron tablets three times daily. Return if any dizziness, shortness of breath, palpitations, chest pain or any other acute symptoms. Please have caution when holding baby to prevent any falls or dizziness with baby in arms.   1 Principal Discharge DX:	Status post primary low transverse  section  Assessment and plan of treatment:	Continue breastfeeding.  Motrin as needed for pain.  Ambulate daily.  No heavy lifting or anything per vagina x 6 weeks - no sex, tampons, douching, tub baths, etc.  Follow up in office in 1-2 weeks for incision check, and then at 6 weeks for postpartum check.  Secondary Diagnosis:	Acute on chronic blood loss anemia  Assessment and plan of treatment:	take iron, folic acid, vitamin C, and prenatal vitamins. eat iron fortified food

## 2023-08-26 NOTE — DISCHARGE NOTE OB - PLAN OF CARE
Continue breastfeeding.  Motrin as needed for pain.  Ambulate daily.  No heavy lifting or anything per vagina x 6 weeks - no sex, tampons, douching, tub baths, etc.  Follow up in office in 1-2 weeks for incision check, and then at 6 weeks for postpartum check. take iron, folic acid, vitamin C, and prenatal vitamins. eat iron fortified food. Please take iron tablets three times daily. Return if any dizziness, shortness of breath, palpitations, chest pain or any other acute symptoms. Please have caution when holding baby to prevent any falls or dizziness with baby in arms. take iron, folic acid, vitamin C, and prenatal vitamins. eat iron fortified food

## 2023-08-26 NOTE — PROGRESS NOTE ADULT - ASSESSMENT
A/P: 26yo admitted in early labor for scheduled c/s due to breech presentation POD #1 s/p primary c/s @ 39wks for breech presentation, acute on chronic anemia, pt stable  -Pain management as needed  -DVT ppx: OOB and ambulate, heparin  -orthostatics reviewed with dr. beck, negative and pt asymptomatic at this time  discussed possible blood transfusion with pt  -iron, vitc, pnv   -f/u Rpt CBC   - f/u trial of void  -Advance diet to regular  -Encourage breastfeeding   -pt seen and evaluated with dr. beck  - d/w dr. haji

## 2023-08-27 VITALS
OXYGEN SATURATION: 97 % | SYSTOLIC BLOOD PRESSURE: 117 MMHG | TEMPERATURE: 98 F | DIASTOLIC BLOOD PRESSURE: 79 MMHG | HEART RATE: 77 BPM | RESPIRATION RATE: 18 BRPM

## 2023-08-27 DIAGNOSIS — D64.9 ANEMIA, UNSPECIFIED: ICD-10-CM

## 2023-08-27 LAB
APPEARANCE UR: CLEAR — SIGNIFICANT CHANGE UP
BACTERIA # UR AUTO: ABNORMAL /HPF
BILIRUB UR-MCNC: NEGATIVE — SIGNIFICANT CHANGE UP
COLOR SPEC: YELLOW — SIGNIFICANT CHANGE UP
COMMENT - URINE: SIGNIFICANT CHANGE UP
DIFF PNL FLD: ABNORMAL
EPI CELLS # UR: PRESENT
GLUCOSE UR QL: NEGATIVE MG/DL — SIGNIFICANT CHANGE UP
KETONES UR-MCNC: ABNORMAL MG/DL
LEUKOCYTE ESTERASE UR-ACNC: ABNORMAL
NITRITE UR-MCNC: NEGATIVE — SIGNIFICANT CHANGE UP
PH UR: 5.5 — SIGNIFICANT CHANGE UP (ref 5–8)
PROT UR-MCNC: ABNORMAL MG/DL
RBC CASTS # UR COMP ASSIST: 0 /HPF — SIGNIFICANT CHANGE UP (ref 0–4)
SP GR SPEC: 1.03 — SIGNIFICANT CHANGE UP (ref 1–1.03)
UROBILINOGEN FLD QL: 1 MG/DL — SIGNIFICANT CHANGE UP (ref 0.2–1)
WBC UR QL: 3 /HPF — SIGNIFICANT CHANGE UP (ref 0–5)

## 2023-08-27 PROCEDURE — 59050 FETAL MONITOR W/REPORT: CPT

## 2023-08-27 PROCEDURE — 59025 FETAL NON-STRESS TEST: CPT

## 2023-08-27 PROCEDURE — 85025 COMPLETE CBC W/AUTO DIFF WBC: CPT

## 2023-08-27 PROCEDURE — 81001 URINALYSIS AUTO W/SCOPE: CPT

## 2023-08-27 PROCEDURE — G0463: CPT

## 2023-08-27 PROCEDURE — 36415 COLL VENOUS BLD VENIPUNCTURE: CPT

## 2023-08-27 RX ADMIN — Medication 1 MILLIGRAM(S): at 11:07

## 2023-08-27 RX ADMIN — Medication 975 MILLIGRAM(S): at 02:09

## 2023-08-27 RX ADMIN — HEPARIN SODIUM 5000 UNIT(S): 5000 INJECTION INTRAVENOUS; SUBCUTANEOUS at 05:10

## 2023-08-27 RX ADMIN — Medication 975 MILLIGRAM(S): at 06:12

## 2023-08-27 RX ADMIN — Medication 975 MILLIGRAM(S): at 01:09

## 2023-08-27 RX ADMIN — Medication 1 TABLET(S): at 11:07

## 2023-08-27 RX ADMIN — Medication 600 MILLIGRAM(S): at 05:09

## 2023-08-27 RX ADMIN — MAGNESIUM HYDROXIDE 30 MILLILITER(S): 400 TABLET, CHEWABLE ORAL at 11:07

## 2023-08-27 RX ADMIN — Medication 500 MILLIGRAM(S): at 06:12

## 2023-08-27 RX ADMIN — Medication 325 MILLIGRAM(S): at 06:12

## 2023-08-27 RX ADMIN — SIMETHICONE 80 MILLIGRAM(S): 80 TABLET, CHEWABLE ORAL at 11:07

## 2023-08-27 RX ADMIN — Medication 975 MILLIGRAM(S): at 07:12

## 2023-08-27 RX ADMIN — Medication 600 MILLIGRAM(S): at 11:08

## 2023-08-27 RX ADMIN — Medication 600 MILLIGRAM(S): at 06:09

## 2023-08-27 RX ADMIN — Medication 600 MILLIGRAM(S): at 12:00

## 2023-08-27 NOTE — PROVIDER CONTACT NOTE (OTHER) - SITUATION
rounding on patient c/s post op day 2. and she reports new symptom of burning at the end of urination

## 2023-08-27 NOTE — PROGRESS NOTE ADULT - SUBJECTIVE AND OBJECTIVE BOX
Patient seen at bedside resting comfortably offers no new complaints. not yet ambulating, poole just removed no void spontaneously yet.  + flatus;  no bowel movement; tolerating clear liquid diet.  Pt both breast and bottle feeding. Pt denies headache, weakness, chest pain, shortness of breath, blurry vision or epigastric pain, N/V/D,  palpitations, worsening vaginal bleeding.    Vital Signs Last 24 Hrs  T(C): 36.8 (26 Aug 2023 08:05), Max: 36.8 (25 Aug 2023 14:23)  T(F): 98.3 (26 Aug 2023 08:05), Max: 98.3 (26 Aug 2023 08:05)  HR: 89 (26 Aug 2023 04:00) (87 - 91)  BP: 106/67 (26 Aug 2023 04:00) (102/65 - 111/72)  BP(mean): --  RR: 18 (26 Aug 2023 04:00) (17 - 18)  SpO2: 97% (26 Aug 2023 04:00) (97% - 98%)    Parameters below as of 26 Aug 2023 04:00  Patient On (Oxygen Delivery Method): room air        Gen: A&O x 3, NAD  Chest: CTA B/L  Cardiac: S1,S2  RRR  Breast: Soft, nontender, nonengorged  Abdomen: +BS; soft; Nontender, nondistended; dressing removed incision C/D/I steri strips in place  Gyn: minimal lochia   Extremities: Nontender, venodynes in place, no calf tenderness                          7.0    11.38 )-----------( 111      ( 26 Aug 2023 06:00 )             23.6       A/P: 26yo admitted in early labor for scheduled c/s due to breech presentation POD #1 s/p primary c/s @ 39wks for breech presentation, acute on chronic anemia, pt stable  -Pain management as needed  -DVT ppx: OOB and ambulate, heparin  -orthostatics reviewed with dr. beck, negative and pt asymptomatic at this time  discussed possible blood transfusion with pt  -iron, vitc, pnv   -f/u Rpt CBC   - f/u trial of void  -Advance diet to regular  -Encourage breastfeeding   -pt seen and evaluated with dr. beck  - d/w dr. haji 
Patient seen at bedside resting comfortably offers no new complaints. + Ambulation, + void without difficulty, + flatus;  + bm;  tolerating regular diet. both breastfeeding and bottle feeding. Denies HA, blurry vision or epigastric pain, CP, SOB, N/V/D,  dizziness, palpitations, worsening vaginal bleeding.     Vital Signs Last 24 Hrs  T(C): 36.6 (27 Aug 2023 06:05), Max: 36.8 (26 Aug 2023 12:10)  T(F): 97.9 (27 Aug 2023 06:05), Max: 98.3 (26 Aug 2023 12:10)  HR: 77 (27 Aug 2023 06:05) (77 - 92)  BP: 117/79 (27 Aug 2023 06:05) (97/64 - 117/79)  BP(mean): --  RR: 18 (27 Aug 2023 06:05) (18 - 18)  SpO2: 97% (27 Aug 2023 06:05) (97% - 98%)    Parameters below as of 27 Aug 2023 06:05  Patient On (Oxygen Delivery Method): room air    Gen: A&O x 3, NAD  Chest: CTA B/L  Cardiac: S1,S2  RRR  Breast: Soft, nontender, nonengorged  Abdomen: +BS; soft; Nontender, nondistended, Incision C/D/I steri strips in place   Gyn: Minimal lochia  Extremities: Nontender, DTRS 2+, no worsening edema                          7.5    13.72 )-----------( 145      ( 26 Aug 2023 18:00 )             25.2

## 2023-08-27 NOTE — PROVIDER CONTACT NOTE (OTHER) - BACKGROUND
c/s post op day 2. oob voiding wnl vs and assessment wnl except new onset burning at end of urination.

## 2023-08-27 NOTE — PROGRESS NOTE ADULT - PROBLEM SELECTOR PLAN 1
-d/c home   -instructions verbalized  -follow up in 1-2weeks in office for incision check  -d/w dr Oquendo
A/P: 26yo admitted in early labor for scheduled c/s due to breech presentation POD #1 s/p primary c/s @ 39wks for breech presentation, acute on chronic anemia, pt stable  -Pain management as needed  -DVT ppx: OOB and ambulate, heparin  -orthostatics reviewed with dr. beck, negative and pt asymptomatic at this time  discussed possible blood transfusion with pt if she becomes symptomatic or her hemoglobin drops further   -iron, vitc, pnv   -f/u Rpt CBC   - f/u trial of void  -Advance diet to regular  -Encourage breastfeeding   -pt seen and evaluated with dr. beck  - d/w dr. haji

## 2023-08-28 PROCEDURE — G0463: CPT

## 2023-09-07 ENCOUNTER — TRANSCRIPTION ENCOUNTER (OUTPATIENT)
Age: 28
End: 2023-09-07

## 2024-06-03 NOTE — CHART NOTE - NSCHARTNOTEFT_GEN_A_CORE
OB PA Postop Note     Patient seen at bedside, resting comfortably offers no new complaints. Poole in place, tolerating clear diet at this time.  Attempting breastfeeding.  Denies HA, CP, SOB, N/V/D, dizziness, palpitations, worsening abdominal pain, worsening vaginal bleeding, or any other concerns.      Vital Signs Last 24 Hrs  T(C): 36.6 (25 Aug 2023 08:34), Max: 37 (25 Aug 2023 04:50)  T(F): 97.9 (25 Aug 2023 08:34), Max: 98.6 (25 Aug 2023 04:50)  HR: 71 (25 Aug 2023 08:34) (71 - 105)  BP: 128/82 (25 Aug 2023 08:34) (100/85 - 128/82)  BP(mean): 91 (25 Aug 2023 06:35) (75 - 101)  RR: 18 (25 Aug 2023 08:34) (18 - 18)  SpO2: 100% (25 Aug 2023 08:34) (97% - 100%)    Parameters below as of 25 Aug 2023 08:34  Patient On (Oxygen Delivery Method): room air        Gen: A&O x 3, NAD  Chest: CTA B/L  Cardiac: S1, S2  RRR  Abdomen: +BS; soft; NT; ND; Dressing C/D/I, fundus firm  Gyn: Minimal lochia, poole  Ext: Nontender calf b/l, venodynes                        A/P: POD #0 s/p stat rpt c/s breech in labor 39 weeks, stable      -heparin dvt prophylaxis  -Pain management, postop care  -OOB and ambulate, incentive spirometry  -maintain poole  -Advance diet with flatus  -Encourage breastfeeding   -d/w Dr. Jere hernandez attending Xray images(s)